# Patient Record
Sex: FEMALE | Race: WHITE
[De-identification: names, ages, dates, MRNs, and addresses within clinical notes are randomized per-mention and may not be internally consistent; named-entity substitution may affect disease eponyms.]

---

## 2017-07-22 ENCOUNTER — HEALTH MAINTENANCE LETTER (OUTPATIENT)
Age: 62
End: 2017-07-22

## 2017-11-07 ENCOUNTER — HOSPITAL ENCOUNTER (OUTPATIENT)
Facility: CLINIC | Age: 62
Discharge: HOME | End: 2017-11-07
Attending: PHYSICAL MEDICINE & REHABILITATION | Admitting: PHYSICAL MEDICINE & REHABILITATION
Payer: COMMERCIAL

## 2017-11-07 ENCOUNTER — ANESTHESIA EVENT (OUTPATIENT)
Dept: SURGERY | Facility: CLINIC | Age: 62
End: 2017-11-07
Payer: COMMERCIAL

## 2017-11-07 ENCOUNTER — ANESTHESIA (OUTPATIENT)
Dept: SURGERY | Facility: CLINIC | Age: 62
End: 2017-11-07
Payer: COMMERCIAL

## 2017-11-07 VITALS
SYSTOLIC BLOOD PRESSURE: 151 MMHG | DIASTOLIC BLOOD PRESSURE: 98 MMHG | OXYGEN SATURATION: 96 % | TEMPERATURE: 98.2 F | BODY MASS INDEX: 37.72 KG/M2 | RESPIRATION RATE: 16 BRPM | HEIGHT: 69 IN | WEIGHT: 254.7 LBS

## 2017-11-07 PROCEDURE — 25000132 ZZH RX MED GY IP 250 OP 250 PS 637: Performed by: ANESTHESIOLOGY

## 2017-11-07 PROCEDURE — C1787 PATIENT PROGR, NEUROSTIM: HCPCS | Performed by: PHYSICAL MEDICINE & REHABILITATION

## 2017-11-07 PROCEDURE — 27211024 ZZHC OR SUPPLY OTHER OPNP: Performed by: PHYSICAL MEDICINE & REHABILITATION

## 2017-11-07 PROCEDURE — C1820 GENERATOR NEURO RECHG BAT SY: HCPCS | Performed by: PHYSICAL MEDICINE & REHABILITATION

## 2017-11-07 PROCEDURE — 36000063 ZZH SURGERY LEVEL 4 EA 15 ADDTL MIN: Performed by: PHYSICAL MEDICINE & REHABILITATION

## 2017-11-07 PROCEDURE — 37000009 ZZH ANESTHESIA TECHNICAL FEE, EACH ADDTL 15 MIN: Performed by: PHYSICAL MEDICINE & REHABILITATION

## 2017-11-07 PROCEDURE — 25000128 H RX IP 250 OP 636: Performed by: NURSE ANESTHETIST, CERTIFIED REGISTERED

## 2017-11-07 PROCEDURE — 25000128 H RX IP 250 OP 636: Performed by: ANESTHESIOLOGY

## 2017-11-07 PROCEDURE — 71000012 ZZH RECOVERY PHASE 1 LEVEL 1 FIRST HR: Performed by: PHYSICAL MEDICINE & REHABILITATION

## 2017-11-07 PROCEDURE — 71000027 ZZH RECOVERY PHASE 2 EACH 15 MINS: Performed by: PHYSICAL MEDICINE & REHABILITATION

## 2017-11-07 PROCEDURE — 36000065 ZZH SURGERY LEVEL 4 W FLUORO 1ST 30 MIN: Performed by: PHYSICAL MEDICINE & REHABILITATION

## 2017-11-07 PROCEDURE — 27210794 ZZH OR GENERAL SUPPLY STERILE: Performed by: PHYSICAL MEDICINE & REHABILITATION

## 2017-11-07 PROCEDURE — 25000125 ZZHC RX 250: Performed by: NURSE ANESTHETIST, CERTIFIED REGISTERED

## 2017-11-07 PROCEDURE — 40000170 ZZH STATISTIC PRE-PROCEDURE ASSESSMENT II: Performed by: PHYSICAL MEDICINE & REHABILITATION

## 2017-11-07 PROCEDURE — 25000128 H RX IP 250 OP 636: Performed by: PHYSICAL MEDICINE & REHABILITATION

## 2017-11-07 PROCEDURE — 37000008 ZZH ANESTHESIA TECHNICAL FEE, 1ST 30 MIN: Performed by: PHYSICAL MEDICINE & REHABILITATION

## 2017-11-07 PROCEDURE — 27210995 ZZH RX 272: Performed by: PHYSICAL MEDICINE & REHABILITATION

## 2017-11-07 DEVICE — IMPLANTABLE DEVICE: Type: IMPLANTABLE DEVICE | Site: BACK | Status: FUNCTIONAL

## 2017-11-07 RX ORDER — LIDOCAINE HYDROCHLORIDE 20 MG/ML
INJECTION, SOLUTION INFILTRATION; PERINEURAL PRN
Status: DISCONTINUED | OUTPATIENT
Start: 2017-11-07 | End: 2017-11-07

## 2017-11-07 RX ORDER — LIDOCAINE 40 MG/G
CREAM TOPICAL
Status: DISCONTINUED | OUTPATIENT
Start: 2017-11-07 | End: 2017-11-07 | Stop reason: HOSPADM

## 2017-11-07 RX ORDER — NALOXONE HYDROCHLORIDE 0.4 MG/ML
.1-.4 INJECTION, SOLUTION INTRAMUSCULAR; INTRAVENOUS; SUBCUTANEOUS
Status: DISCONTINUED | OUTPATIENT
Start: 2017-11-07 | End: 2017-11-07 | Stop reason: HOSPADM

## 2017-11-07 RX ORDER — SODIUM CHLORIDE, SODIUM LACTATE, POTASSIUM CHLORIDE, CALCIUM CHLORIDE 600; 310; 30; 20 MG/100ML; MG/100ML; MG/100ML; MG/100ML
INJECTION, SOLUTION INTRAVENOUS CONTINUOUS PRN
Status: DISCONTINUED | OUTPATIENT
Start: 2017-11-07 | End: 2017-11-07

## 2017-11-07 RX ORDER — FENTANYL CITRATE 50 UG/ML
INJECTION, SOLUTION INTRAMUSCULAR; INTRAVENOUS PRN
Status: DISCONTINUED | OUTPATIENT
Start: 2017-11-07 | End: 2017-11-07

## 2017-11-07 RX ORDER — MULTIPLE VITAMINS W/ MINERALS TAB 9MG-400MCG
1 TAB ORAL DAILY
COMMUNITY

## 2017-11-07 RX ORDER — ONDANSETRON 2 MG/ML
INJECTION INTRAMUSCULAR; INTRAVENOUS PRN
Status: DISCONTINUED | OUTPATIENT
Start: 2017-11-07 | End: 2017-11-07

## 2017-11-07 RX ORDER — CEFAZOLIN SODIUM 2 G/100ML
2 INJECTION, SOLUTION INTRAVENOUS
Status: COMPLETED | OUTPATIENT
Start: 2017-11-07 | End: 2017-11-07

## 2017-11-07 RX ORDER — SODIUM CHLORIDE, SODIUM LACTATE, POTASSIUM CHLORIDE, CALCIUM CHLORIDE 600; 310; 30; 20 MG/100ML; MG/100ML; MG/100ML; MG/100ML
INJECTION, SOLUTION INTRAVENOUS CONTINUOUS
Status: DISCONTINUED | OUTPATIENT
Start: 2017-11-07 | End: 2017-11-07 | Stop reason: HOSPADM

## 2017-11-07 RX ORDER — HYDROMORPHONE HYDROCHLORIDE 1 MG/ML
.3-.5 INJECTION, SOLUTION INTRAMUSCULAR; INTRAVENOUS; SUBCUTANEOUS EVERY 10 MIN PRN
Status: DISCONTINUED | OUTPATIENT
Start: 2017-11-07 | End: 2017-11-07 | Stop reason: HOSPADM

## 2017-11-07 RX ORDER — ONDANSETRON 4 MG/1
4 TABLET, ORALLY DISINTEGRATING ORAL EVERY 30 MIN PRN
Status: DISCONTINUED | OUTPATIENT
Start: 2017-11-07 | End: 2017-11-07 | Stop reason: HOSPADM

## 2017-11-07 RX ORDER — OXYCODONE AND ACETAMINOPHEN 5; 325 MG/1; MG/1
1 TABLET ORAL ONCE
Status: COMPLETED | OUTPATIENT
Start: 2017-11-07 | End: 2017-11-07

## 2017-11-07 RX ORDER — MEPERIDINE HYDROCHLORIDE 25 MG/ML
12.5 INJECTION INTRAMUSCULAR; INTRAVENOUS; SUBCUTANEOUS
Status: DISCONTINUED | OUTPATIENT
Start: 2017-11-07 | End: 2017-11-07 | Stop reason: HOSPADM

## 2017-11-07 RX ORDER — MAGNESIUM HYDROXIDE 1200 MG/15ML
LIQUID ORAL PRN
Status: DISCONTINUED | OUTPATIENT
Start: 2017-11-07 | End: 2017-11-07 | Stop reason: HOSPADM

## 2017-11-07 RX ORDER — ONDANSETRON 2 MG/ML
4 INJECTION INTRAMUSCULAR; INTRAVENOUS EVERY 30 MIN PRN
Status: DISCONTINUED | OUTPATIENT
Start: 2017-11-07 | End: 2017-11-07 | Stop reason: HOSPADM

## 2017-11-07 RX ORDER — PROPOFOL 10 MG/ML
INJECTION, EMULSION INTRAVENOUS CONTINUOUS PRN
Status: DISCONTINUED | OUTPATIENT
Start: 2017-11-07 | End: 2017-11-07

## 2017-11-07 RX ORDER — FENTANYL CITRATE 50 UG/ML
25-50 INJECTION, SOLUTION INTRAMUSCULAR; INTRAVENOUS
Status: DISCONTINUED | OUTPATIENT
Start: 2017-11-07 | End: 2017-11-07 | Stop reason: HOSPADM

## 2017-11-07 RX ADMIN — ONDANSETRON 4 MG: 2 INJECTION INTRAMUSCULAR; INTRAVENOUS at 13:13

## 2017-11-07 RX ADMIN — SODIUM CHLORIDE, POTASSIUM CHLORIDE, SODIUM LACTATE AND CALCIUM CHLORIDE: 600; 310; 30; 20 INJECTION, SOLUTION INTRAVENOUS at 12:42

## 2017-11-07 RX ADMIN — FENTANYL CITRATE 50 MCG: 50 INJECTION INTRAMUSCULAR; INTRAVENOUS at 13:48

## 2017-11-07 RX ADMIN — FENTANYL CITRATE 50 MCG: 50 INJECTION, SOLUTION INTRAMUSCULAR; INTRAVENOUS at 13:09

## 2017-11-07 RX ADMIN — DEXMEDETOMIDINE HYDROCHLORIDE 0.4 MCG/KG/HR: 100 INJECTION, SOLUTION INTRAVENOUS at 12:48

## 2017-11-07 RX ADMIN — LIDOCAINE HYDROCHLORIDE 60 MG: 20 INJECTION, SOLUTION INFILTRATION; PERINEURAL at 12:51

## 2017-11-07 RX ADMIN — FENTANYL CITRATE 50 MCG: 50 INJECTION, SOLUTION INTRAMUSCULAR; INTRAVENOUS at 12:52

## 2017-11-07 RX ADMIN — MIDAZOLAM HYDROCHLORIDE 2 MG: 1 INJECTION, SOLUTION INTRAMUSCULAR; INTRAVENOUS at 12:42

## 2017-11-07 RX ADMIN — FENTANYL CITRATE 50 MCG: 50 INJECTION INTRAMUSCULAR; INTRAVENOUS at 13:34

## 2017-11-07 RX ADMIN — PROPOFOL 100 MCG/KG/MIN: 10 INJECTION, EMULSION INTRAVENOUS at 12:52

## 2017-11-07 RX ADMIN — OXYCODONE HYDROCHLORIDE AND ACETAMINOPHEN 1 TABLET: 5; 325 TABLET ORAL at 13:55

## 2017-11-07 RX ADMIN — CEFAZOLIN SODIUM 2 G: 2 INJECTION, SOLUTION INTRAVENOUS at 12:57

## 2017-11-07 NOTE — ANESTHESIA CARE TRANSFER NOTE
Patient: Erin Lux    Procedure(s):  SPINAL CORD STIMULATOR BATTERY/GENERATOR REPLACEMENT  - Wound Class: I-Clean    Diagnosis: LOW BACK PAIN  Diagnosis Additional Information: No value filed.    Anesthesia Type:   MAC     Note:  Airway :Room Air  Patient transferred to:PACU  Comments: Transferred to PACU, spontaneous RR, on room air.  Monitors and alarms on and functioning, VSS, patient awake and comfortable.  Report to PACU RN.Handoff Report: Identifed the Patient, Identified the Reponsible Provider, Reviewed the pertinent medical history, Discussed the surgical course, Reviewed Intra-OP anesthesia mangement and issues during anesthesia, Set expectations for post-procedure period and Allowed opportunity for questions and acknowledgement of understanding      Vitals: (Last set prior to Anesthesia Care Transfer)    CRNA VITALS  11/7/2017 1253 - 11/7/2017 1327      11/7/2017             Resp Rate (set): 10                Electronically Signed By: MADIE Brunson CRNA  November 7, 2017  1:27 PM

## 2017-11-07 NOTE — ANESTHESIA POSTPROCEDURE EVALUATION
Patient: Erin Lux    Procedure(s):  SPINAL CORD STIMULATOR BATTERY/GENERATOR REPLACEMENT  - Wound Class: I-Clean    Diagnosis:LOW BACK PAIN  Diagnosis Additional Information: No value filed.    Anesthesia Type:  MAC    Note:  Anesthesia Post Evaluation    Patient location during evaluation: PACU  Patient participation: Able to fully participate in evaluation  Level of consciousness: awake  Pain management: adequate  Cardiovascular status: acceptable  Respiratory status: acceptable  Hydration status: acceptable  PONV: none     Anesthetic complications: None          Last vitals:  Vitals:    11/07/17 1348 11/07/17 1400 11/07/17 1446   BP:  125/80 (!) 151/98   Resp: 14 14 16   Temp:      SpO2: 96% 95% 96%         Electronically Signed By: CHRISTINE HERNANDEZ  November 7, 2017  5:00 PM

## 2017-11-07 NOTE — DISCHARGE INSTRUCTIONS
Same Day Surgery Discharge Instructions for  Sedation and General Anesthesia       It's not unusual to feel dizzy, light-headed or faint for up to 24 hours after surgery or while taking pain medication.  If you have these symptoms: sit for a few minutes before standing and have someone assist you when you get up to walk or use the bathroom.      You should rest and relax for the next 24 hours. We recommend you make arrangements to have an adult stay with you for at least 24 hours after your discharge.  Avoid hazardous and strenuous activity.      DO NOT DRIVE any vehicle or operate mechanical equipment for 24 hours following the end of your surgery.  Even though you may feel normal, your reactions may be affected by the medication you have received.      Do not drink alcoholic beverages for 24 hours following surgery.       Slowly progress to your regular diet as you feel able. It's not unusual to feel nauseated and/or vomit after receiving anesthesia.  If you develop these symptoms, drink clear liquids (apple juice, ginger ale, broth, 7-up, etc. ) until you feel better.  If your nausea and vomiting persists for 24 hours, please notify your surgeon.        All narcotic pain medications, along with inactivity and anesthesia, can cause constipation. Drinking plenty of liquids and increasing fiber intake will help.      For any questions of a medical nature, call your surgeon.      Do not make important decisions for 24 hours.      If you had general anesthesia, you may have a sore throat for a couple of days related to the breathing tube used during surgery.  You may use Cepacol lozenges to help with this discomfort.  If it worsens or if you develop a fever, contact your surgeon.       If you feel your pain is not well managed with the pain medications prescribed by your surgeon, please contact your surgeon's office to let them know so they can address your concerns.         Follow up Friday at clinic at 10:30  AM      NEUROSTIMULATOR (SPINAL CORD STIMULATOR) HANDOUT  [facility]    THE GOALS AND EXPECTATIONS OF THE NEUROSTIMULATOR  1. You will need to refrain from having any dental work or colonoscopy for 2 weeks prior and 2 weeks after this procedure.  2. Certain things such as an MRI, ultrasound, diathermy, chiropractic manipulation, radiation or electrocautery may damage your neurostimulator and you should avoid these things or get MD approval first.  Cardiac pacemakers and defibrillators may interfere with the neurostimulator.  3. Normal household equipment such as cell phones, microwaves, TV s, computers, electric blankets and heating pads will not damage the neurostimulator.  4. There are activity restrictions during the recovery period after the trial implant as well as after the permanent implant.    ACTIVITY RESTRICTIONS  The following restrictions will be in place during your trial while the leads are in place and for 4-6 weeks following lead placement of the permanent neurostimulator:  1. No raising hands above the head.  2. No twisting, bending or stretching body at the waist.  3. No lifting items greater than 5 pounds.  4. No sitting for prolonged periods of time 2 hours maximum.  (Some things that may help you comply with these restrictions are: slip on shoes, a step stool for the kitchen or bathroom and a  reacher  to grasp objects *these can be purchased at most pharmacies.)    *Following these restrictions will help ensure your leads do not move from the epidural space where  Will placed them.  Once the permanent device is implanted, scar tissue will build up which will help ensure the leads do not migrate.    SITUATIONS IN WHICH YOU CANNOT USE THE STIMULATOR  1. Driving an automobile, motorcycle, boat, riding  or anything motorized.  2. Operating any type of machinery or equipment such as: chain saw, electric nailer or wood cutting tools.    *Why?  Any quick movement or position  change can cause extra stimulation to the nerves resulting in you losing your ability to control an automobile or tool.  You will eventually learn which positions cause this extra stimulation, but it is best for you to use the magnet to simply turn off the stimulator while doing these tasks in order to avoid accidents.    RISKS  1. Any complication following surgery or anesthesia is possible, including but not limited to bleeding, infection, and death.  2. Infection in the epidural space could potentially lead to meningitis or an epidural abscess.  This would result in removal of the spinal cord stimulator.  3. Accumulation of fluid in the power source site (seroma).  4. Spinal headache which sometimes requires a blood patch.  5. Mechanical complications with the system including dislodgement of the lead/electrode, breaks in the wiring or problems with the power source.  6. Bleeding into the epidural space could result in a hematoma and nerve damage.  This may require surgical removal of the spinal cord stimulator.    DISCHARGE INSTRUCTIONS  1. Temporary surgical pain may require pain management with the use of opioids (narcotic pain medication prescribed by your provider).  Opioids can cause constipation.  Make sure you are eating plenty of fibrous foods and drinking plenty of water.  For a while, you may need to use an over-the-counter laxative such as Senokot or Milk of Magnesia.  2. You may not shower at all while your trial catheter is in.  You may shower 24 hours after your trial leads are pulled.  You may not shower for 5 days after the permanent implant surgery. Remove your dressings before showering on the 5th day.  3. You may bathe 7 days after your trial leads are pulled.  You may bathe 3 weeks after your permanent implant surgery.  4. You should start your oral antibiotics the day after your trial and/or permanent implantation and continue taking them for 10 days.  5. Sometimes a leakage of cerebral  spinal fluid around the lead site may cause a spinal headache.  To alleviate this headache try lying flat as much as possible and drinking plenty of non-carbonated caffeinated fluids.  6. Stimulator reprogramming may be required periodically for the next few months.  neurost  SYMPTOMS TO REPORT  1. Signs of infection such as chills, fever, increased pain, redness, drainage or swelling from the incision site.  2. Headache persisting beyond 48 hours.  3. Unusual changes in sensation or loss of sensation.  4. Painful sensations from the neurostimulator: PLEASE STOP THE STIMULATOR and call your doctor [phone]!  5. It is considered a medical emergency if you experience:  a. Sudden severe back pain  b. Sudden onset of leg weakness or severe spasms  c. Loss of bladder control and / or bowel function

## 2017-11-07 NOTE — IP AVS SNAPSHOT
[facility]    [address]    [place] [address]    Phone:  [phone]    Fax:  [phone]                                       After Visit Summary   11/7/2017    Erin Lux    MRN: [id number]           After Visit Summary Signature Page     I have received my discharge instructions, and my questions have been answered. I have discussed any challenges I see with this plan with the nurse or doctor.    ..........................................................................................................................................  Patient/Patient Representative Signature      ..........................................................................................................................................  Patient Representative Print Name and Relationship to Patient    ..................................................               ................................................  Date                                            Time    ..........................................................................................................................................  Reviewed by Signature/Title    ...................................................              ..............................................  Date                                                            Time

## 2017-11-07 NOTE — ANESTHESIA PREPROCEDURE EVALUATION
Anesthesia Evaluation     . Pt has had prior anesthetic.     History of anesthetic complications          ROS/MED HX    ENT/Pulmonary:     (+)Intermittent Treatment: Inhaler prn,  , . .   (-) sleep apnea   Neurologic:       Cardiovascular:     (+) hypertension----. : . . . :. .       METS/Exercise Tolerance:     Hematologic:         Musculoskeletal:         GI/Hepatic:        (-) GERD   Renal/Genitourinary:         Endo:     (+) Obesity, .      Psychiatric:         Infectious Disease:         Malignancy:         Other:    (+) H/O Chronic Pain,                   Physical Exam  Normal systems: cardiovascular and pulmonary    Airway   Mallampati: II  TM distance: >3 FB  Neck ROM: full    Dental   Comment: native    Cardiovascular       Pulmonary                     Anesthesia Plan      History & Physical Review  History and physical reviewed and following examination; no interval change.    ASA Status:  2 .    NPO Status:  > 8 hours    Plan for MAC   PONV prophylaxis:  Ondansetron (or other 5HT-3)       Postoperative Care      Consents  Anesthetic plan, risks, benefits and alternatives discussed with:  Patient..                          .

## 2017-11-07 NOTE — PROGRESS NOTES
Admission medication history interview status for the 11/7/2017  admission is complete. See EPIC admission navigator for prior to admission medications     Medication history source reliability:Good    Medication history interview source(s):Patient    Medication history resources (including written lists, pill bottles, clinic record):None    Primary pharmacy.[facility]    Additional medication history information not noted on PTA med list :None    Time spent in this activity: 45 minutes    Prior to Admission medications    Medication Sig Last Dose Taking? Auth Provider   CALCIUM PO Take 1 tablet by mouth daily 11/6/2017 at am Yes Reported, Patient   ZOLPIDEM TARTRATE PO Take 10 mg by mouth At Bedtime 11/6/2017 at pm Yes Reported, Patient   ATORVASTATIN CALCIUM PO Take 20 mg by mouth daily 11/6/2017 at 2100 Yes Reported, Patient   Loratadine (CLARITIN PO) Take 10 mg by mouth daily as needed 10/31/2017 at prn Yes Reported, Patient   multivitamin, therapeutic with minerals (THERA-VIT-M) TABS tablet Take 1 tablet by mouth daily 11/6/2017 at am Yes Reported, Patient   Multiple Vitamins-Minerals (OCUVITE PO) Take 1 tablet by mouth daily 11/6/2017 at am Yes Reported, Patient   Ibuprofen (ADVIL PO) Take 400 mg by mouth 4 times daily as needed for moderate pain 10/31/2017 at prn Yes Reported, Patient   Polyethyl Glycol-Propyl Glycol (SYSTANE OP) Place 1-2 tablets into both eyes daily as needed 11/3/2017 at prn Yes Reported, Patient   Acetaminophen (TYLENOL PO) Take 500-1,000 mg by mouth daily as needed for mild pain or fever 11/7/2017 at 0000 Yes Reported, Patient   oxyCODONE-acetaminophen (PERCOCET) 5-325 MG per tablet Take 1 tablet by mouth 2 times daily as needed for moderate to severe pain 11/6/2017 at 2100 Yes Reported, Patient   albuterol (VENTOLIN HFA) 108 (90 BASE) MCG/ACT inhaler Inhale 2 puffs into the lungs CFC FREE.  EVERY 4-6 HOURS PRN more than a month at prn Yes Reported, Patient   fluticasone (FLONASE) 50 MCG/ACT  nasal spray Spray 2 sprays into both nostrils daily as needed  10/31/2017 Yes Reported, Patient   olopatadine (PATANOL) 0.1 % ophthalmic solution Place 1 drop into both eyes 2 times daily as needed for allergies 11/3/2017 at prn Yes Reported, Patient   triamterene-hydrochlorothiazide (MAXZIDE-25) 37.5-25 MG per tablet Take 1 tablet by mouth daily. 11/6/2017 at am Yes Sophie Barrientos NP   Cholecalciferol (VITAMIN D) 2000 UNITS tablet Take 2,000 Units by mouth daily. 11/6/2017 at am Yes Sophie Barrientos, LILLIAN

## 2017-11-07 NOTE — IP AVS SNAPSHOT
MRN:[id number]                      After Visit Summary   11/7/2017    Erin Lux    MRN: [id number]           Thank you!     Thank you for choosing [facility] for your care. Our goal is always to provide you with excellent care. Hearing back from our patients is one way we can continue to improve our services. Please take a few minutes to complete the written survey that you may receive in the mail after you visit with us. Thank you!        Patient Information     Date Of Birth          1955        About your hospital stay     You were admitted on:  November 7, 2017 You last received care in the:  [facility]    You were discharged on:  November 7, 2017       Who to Call     For medical emergencies, please call 911.  For non-urgent questions about your medical care, please call your primary care provider or clinic, [phone]  For questions related to your surgery, please call your surgery clinic        Attending Provider     Provider Terri Magaña, DO Physical Medicine and Rehab       Primary Care Provider Office Phone # Fax #    Malinda RENDON Macias [phone]      Further instructions from your care team       Same Day Surgery Discharge Instructions for  Sedation and General Anesthesia       It's not unusual to feel dizzy, light-headed or faint for up to 24 hours after surgery or while taking pain medication.  If you have these symptoms: sit for a few minutes before standing and have someone assist you when you get up to walk or use the bathroom.      You should rest and relax for the next 24 hours. We recommend you make arrangements to have an adult stay with you for at least 24 hours after your discharge.  Avoid hazardous and strenuous activity.      DO NOT DRIVE any vehicle or operate mechanical equipment for 24 hours following the end of your surgery.  Even though you may feel normal, your reactions may be affected by the  medication you have received.      Do not drink alcoholic beverages for 24 hours following surgery.       Slowly progress to your regular diet as you feel able. It's not unusual to feel nauseated and/or vomit after receiving anesthesia.  If you develop these symptoms, drink clear liquids (apple juice, ginger ale, broth, 7-up, etc. ) until you feel better.  If your nausea and vomiting persists for 24 hours, please notify your surgeon.        All narcotic pain medications, along with inactivity and anesthesia, can cause constipation. Drinking plenty of liquids and increasing fiber intake will help.      For any questions of a medical nature, call your surgeon.      Do not make important decisions for 24 hours.      If you had general anesthesia, you may have a sore throat for a couple of days related to the breathing tube used during surgery.  You may use Cepacol lozenges to help with this discomfort.  If it worsens or if you develop a fever, contact your surgeon.       If you feel your pain is not well managed with the pain medications prescribed by your surgeon, please contact your surgeon's office to let them know so they can address your concerns.         Follow up Friday at clinic at 10:30 AM      NEUROSTIMULATOR (SPINAL CORD STIMULATOR) HANDOUT  [facility]    THE GOALS AND EXPECTATIONS OF THE NEUROSTIMULATOR  1. You will need to refrain from having any dental work or colonoscopy for 2 weeks prior and 2 weeks after this procedure.  2. Certain things such as an MRI, ultrasound, diathermy, chiropractic manipulation, radiation or electrocautery may damage your neurostimulator and you should avoid these things or get MD approval first.  Cardiac pacemakers and defibrillators may interfere with the neurostimulator.  3. Normal household equipment such as cell phones, microwaves, TV s, computers, electric blankets and heating pads will not damage the neurostimulator.  4. There are activity restrictions  during the recovery period after the trial implant as well as after the permanent implant.    ACTIVITY RESTRICTIONS  The following restrictions will be in place during your trial while the leads are in place and for 4-6 weeks following lead placement of the permanent neurostimulator:  1. No raising hands above the head.  2. No twisting, bending or stretching body at the waist.  3. No lifting items greater than 5 pounds.  4. No sitting for prolonged periods of time 2 hours maximum.  (Some things that may help you comply with these restrictions are: slip on shoes, a step stool for the kitchen or bathroom and a  reacher  to grasp objects *these can be purchased at most pharmacies.)    *Following these restrictions will help ensure your leads do not move from the epidural space where  Will placed them.  Once the permanent device is implanted, scar tissue will build up which will help ensure the leads do not migrate.    SITUATIONS IN WHICH YOU CANNOT USE THE STIMULATOR  1. Driving an automobile, motorcycle, boat, riding  or anything motorized.  2. Operating any type of machinery or equipment such as: chain saw, electric nailer or wood cutting tools.    *Why?  Any quick movement or position change can cause extra stimulation to the nerves resulting in you losing your ability to control an automobile or tool.  You will eventually learn which positions cause this extra stimulation, but it is best for you to use the magnet to simply turn off the stimulator while doing these tasks in order to avoid accidents.    RISKS  1. Any complication following surgery or anesthesia is possible, including but not limited to bleeding, infection, and death.  2. Infection in the epidural space could potentially lead to meningitis or an epidural abscess.  This would result in removal of the spinal cord stimulator.  3. Accumulation of fluid in the power source site (seroma).  4. Spinal headache which sometimes requires a blood  patch.  5. Mechanical complications with the system including dislodgement of the lead/electrode, breaks in the wiring or problems with the power source.  6. Bleeding into the epidural space could result in a hematoma and nerve damage.  This may require surgical removal of the spinal cord stimulator.    DISCHARGE INSTRUCTIONS  1. Temporary surgical pain may require pain management with the use of opioids (narcotic pain medication prescribed by your provider).  Opioids can cause constipation.  Make sure you are eating plenty of fibrous foods and drinking plenty of water.  For a while, you may need to use an over-the-counter laxative such as Senokot or Milk of Magnesia.  2. You may not shower at all while your trial catheter is in.  You may shower 24 hours after your trial leads are pulled.  You may not shower for 5 days after the permanent implant surgery. Remove your dressings before showering on the 5th day.  3. You may bathe 7 days after your trial leads are pulled.  You may bathe 3 weeks after your permanent implant surgery.  4. You should start your oral antibiotics the day after your trial and/or permanent implantation and continue taking them for 10 days.  5. Sometimes a leakage of cerebral spinal fluid around the lead site may cause a spinal headache.  To alleviate this headache try lying flat as much as possible and drinking plenty of non-carbonated caffeinated fluids.  6. Stimulator reprogramming may be required periodically for the next few months.  neurost  SYMPTOMS TO REPORT  1. Signs of infection such as chills, fever, increased pain, redness, drainage or swelling from the incision site.  2. Headache persisting beyond 48 hours.  3. Unusual changes in sensation or loss of sensation.  4. Painful sensations from the neurostimulator: PLEASE STOP THE STIMULATOR and call your doctor [phone]!  5. It is considered a medical emergency if you experience:  a. Sudden severe back pain  b. Sudden onset of leg  "weakness or severe spasms  c. Loss of bladder control and / or bowel function      Pending Results     Date and Time Order Name Status Description    9/25/2017 0000 EKG CARDIAC - HIM SCAN Preliminary             Admission Information     Date & Time Provider Department Dept. Phone    11/7/2017 Terri Bloom DO [facility] Same Day Surgery [phone]      Your Vitals Were     Blood Pressure Temperature Respirations Height Weight Pulse Oximetry    133/73 98.2  F (36.8  C) (Temporal) 14 1.753 m (5' 9\") 115.5 kg (254 lb 11.2 oz) 96%    BMI (Body Mass Index)                   37.61 kg/m2           MyChart Information     [facility] [employer] gives you secure access to your electronic health record. If you see a primary care provider, you can also send messages to your care team and make appointments. If you have questions, please call your primary care clinic.  If you do not have a primary care provider, please call [phone] and they will assist you.        Care EveryWhere ID     This is your Care EveryWhere ID. This could be used by other organizations to access your [facility] medical records  [id number]        Equal Access to Services     COURTNEY RUTLEDGE AH: Hadluisa Finley, zacarias hinkle, qapérez stewart, ashley greenberg. So [facility] [phone].    ATENCIÓN: Si habla español, tiene a cohn disposición servicios gratuitos de asistencia lingüística. Llame al [phone].    We comply with applicable federal civil rights laws and [place] laws. We do not discriminate on the basis of race, color, national origin, age, disability, sex, sexual orientation, or gender identity.               Review of your medicines      UNREVIEWED medicines. Ask your doctor about these medicines        Dose / Directions    ADVIL PO        Dose:  400 mg   Take 400 mg by mouth 4 times daily as needed for moderate pain   Refills:  0       ATORVASTATIN CALCIUM PO        Dose:  20 mg   Take 20 mg " by mouth daily   Refills:  0       CALCIUM PO        Dose:  1 tablet   Take 1 tablet by mouth daily   Refills:  0       CLARITIN PO        Dose:  10 mg   Take 10 mg by mouth daily as needed   Refills:  0       fluticasone 50 MCG/ACT spray   Commonly known as:  FLONASE        Dose:  2 spray   Spray 2 sprays into both nostrils daily as needed   Refills:  0       * multivitamin, therapeutic with minerals Tabs tablet        Dose:  1 tablet   Take 1 tablet by mouth daily   Refills:  0       * OCUVITE PO        Dose:  1 tablet   Take 1 tablet by mouth daily   Refills:  0       olopatadine 0.1 % ophthalmic solution   Commonly known as:  PATANOL        Dose:  1 drop   Place 1 drop into both eyes 2 times daily as needed for allergies   Refills:  0       oxyCODONE-acetaminophen 5-325 MG per tablet   Commonly known as:  PERCOCET        Dose:  1 tablet   Take 1 tablet by mouth 2 times daily as needed for moderate to severe pain   Refills:  0       SYSTANE OP        Dose:  1-2 tablet   Place 1-2 tablets into both eyes daily as needed   Refills:  0       triamterene-hydrochlorothiazide 37.5-25 MG per tablet   Commonly known as:  MAXZIDE-25   Used for:  Hypertension goal BP (blood pressure) < 140/90        Dose:  1 tablet   Take 1 tablet by mouth daily.   Quantity:  90 tablet   Refills:  3       TYLENOL PO        Dose:  500-1000 mg   Take 500-1,000 mg by mouth daily as needed for mild pain or fever   Refills:  0       VENTOLIN  (90 BASE) MCG/ACT Inhaler   Generic drug:  albuterol        Dose:  2 puff   Inhale 2 puffs into the lungs CFC FREE.  EVERY 4-6 HOURS PRN   Refills:  0       vitamin D 2000 UNITS tablet        Dose:  2000 Units   Take 2,000 Units by mouth daily.   Quantity:  100 tablet   Refills:  3       ZOLPIDEM TARTRATE PO        Dose:  10 mg   Take 10 mg by mouth At Bedtime   Refills:  0       * Notice:  This list has 2 medication(s) that are the same as other medications prescribed for you. Read the directions  carefully, and ask your doctor or other care provider to review them with you.             Protect others around you: Learn how to safely use, store and throw away your medicines at www.disposemymeds.org.             Medication List: This is a list of all your medications and when to take them. Check marks below indicate your daily home schedule. Keep this list as a reference.      Medications           Morning Afternoon Evening Bedtime As Needed    ADVIL PO   Take 400 mg by mouth 4 times daily as needed for moderate pain                                ATORVASTATIN CALCIUM PO   Take 20 mg by mouth daily                                CALCIUM PO   Take 1 tablet by mouth daily                                CLARITIN PO   Take 10 mg by mouth daily as needed                                fluticasone 50 MCG/ACT spray   Commonly known as:  FLONASE   Spray 2 sprays into both nostrils daily as needed                                * multivitamin, therapeutic with minerals Tabs tablet   Take 1 tablet by mouth daily                                * OCUVITE PO   Take 1 tablet by mouth daily                                olopatadine 0.1 % ophthalmic solution   Commonly known as:  PATANOL   Place 1 drop into both eyes 2 times daily as needed for allergies                                oxyCODONE-acetaminophen 5-325 MG per tablet   Commonly known as:  PERCOCET   Take 1 tablet by mouth 2 times daily as needed for moderate to severe pain   Last time this was given:  1 tablet on 11/7/2017  1:55 PM                                SYSTANE OP   Place 1-2 tablets into both eyes daily as needed                                triamterene-hydrochlorothiazide 37.5-25 MG per tablet   Commonly known as:  MAXZIDE-25   Take 1 tablet by mouth daily.                                TYLENOL PO   Take 500-1,000 mg by mouth daily as needed for mild pain or fever                                VENTOLIN  (90 BASE) MCG/ACT Inhaler   Inhale 2  puffs into the lungs CFC FREE.  EVERY 4-6 HOURS PRN   Generic drug:  albuterol                                vitamin D 2000 UNITS tablet   Take 2,000 Units by mouth daily.                                ZOLPIDEM TARTRATE PO   Take 10 mg by mouth At Bedtime                                * Notice:  This list has 2 medication(s) that are the same as other medications prescribed for you. Read the directions carefully, and ask your doctor or other care provider to review them with you.

## 2017-11-14 NOTE — PROCEDURES
Name:   Erin Lux   :   1955  Procedure date: 2017  Surgeon:   Terri Bloom DO  Procedure:    Medtronic Spinal Cord Stimulator Generator Replacement  Indication:  Low back pain  M54.5    Allergies:  Ingredient Reaction Comment   ADHESIVE BANDAGE     ADHESIVE TAPE     BACITRACIN     POLYMYXIN B     NEOMYCIN SULFATE     POLYMYXIN B     BACITRACIN ZINC       Anesthesia: Propofol, MAC and local.  Implants: All implants were Medtronic products.  The Intellis implantable pulse generator was model #03329 and serial number [id number].    Description of Procedure:  After discussing potential risks and benefits, the patient did wish to proceed and signed a written consent form. The pocket site was marked in the preoperative area. A prophylactic prescription was given for the patient to use for the next 10 days. The patient was brought into the operating room and positioned prone on the operating table taking care relieve all pressure points and place extremities in a comfortable position. I then initiated a surgical timeout stating the patient's name, date of birth, allergies, and the procedure to be performed. MAC anesthesia was induced. The operative field was prepped and draped in normal sterile fashion. The skin was anesthetized with Xylocaine at the appropriate level as identified fluoroscopically. The right buttock pocket with the battery was opened. The connection to the leads were disconnected. A new battery was placed inside the existing pocket. The leads were left in place and connected to the Intellis IPG and all connections were secured to torque. An impedance check was performed and all impedances were within normal limits. The IPG and excess cable were then placed into the gluteal pocket with the excess cable coiled behind the IPG. Macroelectrode testing was performed and the patient was getting adequate stimulation in the targeted areas.     The wound was then irrigated profusely  with saline solution. The incisions were closed in layers using absorbable sutures. Steri strips and sterile dressings were applied after. Final x-ray revealed no significant changes in lead position.     The patient was awakened, positioned supine and delivered to the recovery  room.    Terri Bloom DO

## 2019-11-04 ENCOUNTER — HEALTH MAINTENANCE LETTER (OUTPATIENT)
Age: 64
End: 2019-11-04

## 2020-11-16 ENCOUNTER — HEALTH MAINTENANCE LETTER (OUTPATIENT)
Age: 65
End: 2020-11-16

## 2021-09-18 ENCOUNTER — HEALTH MAINTENANCE LETTER (OUTPATIENT)
Age: 66
End: 2021-09-18

## 2021-11-13 ENCOUNTER — HEALTH MAINTENANCE LETTER (OUTPATIENT)
Age: 66
End: 2021-11-13

## 2022-01-08 ENCOUNTER — HEALTH MAINTENANCE LETTER (OUTPATIENT)
Age: 67
End: 2022-01-08

## 2022-10-03 ENCOUNTER — LAB REQUISITION (OUTPATIENT)
Dept: LAB | Facility: CLINIC | Age: 67
End: 2022-10-03
Payer: MEDICARE

## 2022-10-03 DIAGNOSIS — Z11.1 ENCOUNTER FOR SCREENING FOR RESPIRATORY TUBERCULOSIS: ICD-10-CM

## 2022-10-04 PROCEDURE — 86481 TB AG RESPONSE T-CELL SUSP: CPT | Mod: ORL | Performed by: INTERNAL MEDICINE

## 2022-10-04 PROCEDURE — 36415 COLL VENOUS BLD VENIPUNCTURE: CPT | Mod: ORL | Performed by: INTERNAL MEDICINE

## 2022-10-04 PROCEDURE — P9603 ONE-WAY ALLOW PRORATED MILES: HCPCS | Mod: ORL | Performed by: INTERNAL MEDICINE

## 2022-10-05 ENCOUNTER — LAB REQUISITION (OUTPATIENT)
Dept: LAB | Facility: CLINIC | Age: 67
End: 2022-10-05
Payer: MEDICARE

## 2022-10-05 DIAGNOSIS — D50.9 IRON DEFICIENCY ANEMIA, UNSPECIFIED: ICD-10-CM

## 2022-10-05 LAB
GAMMA INTERFERON BACKGROUND BLD IA-ACNC: 0.04 IU/ML
M TB IFN-G BLD-IMP: ABNORMAL
M TB IFN-G CD4+ BCKGRND COR BLD-ACNC: 0.15 IU/ML
MITOGEN IGNF BCKGRD COR BLD-ACNC: 0.01 IU/ML
MITOGEN IGNF BCKGRD COR BLD-ACNC: 0.02 IU/ML
QUANTIFERON MITOGEN: 0.19 IU/ML
QUANTIFERON NIL TUBE: 0.04 IU/ML
QUANTIFERON TB1 TUBE: 0.06 IU/ML
QUANTIFERON TB2 TUBE: 0.05

## 2022-10-06 LAB — HGB BLD-MCNC: 8.6 G/DL (ref 11.7–15.7)

## 2022-10-06 PROCEDURE — 36415 COLL VENOUS BLD VENIPUNCTURE: CPT | Mod: ORL | Performed by: INTERNAL MEDICINE

## 2022-10-06 PROCEDURE — 85018 HEMOGLOBIN: CPT | Performed by: INTERNAL MEDICINE

## 2022-10-06 PROCEDURE — P9603 ONE-WAY ALLOW PRORATED MILES: HCPCS | Performed by: INTERNAL MEDICINE

## 2022-11-20 ENCOUNTER — HEALTH MAINTENANCE LETTER (OUTPATIENT)
Age: 67
End: 2022-11-20

## 2023-04-15 ENCOUNTER — HEALTH MAINTENANCE LETTER (OUTPATIENT)
Age: 68
End: 2023-04-15

## 2023-11-19 ENCOUNTER — HEALTH MAINTENANCE LETTER (OUTPATIENT)
Age: 68
End: 2023-11-19

## (undated) DEVICE — DRSG TEGADERM 8X12" 1629

## (undated) DEVICE — NDL 22GA 1.5"

## (undated) DEVICE — DRAPE STERI TOWEL LG 1010

## (undated) DEVICE — PACK MINOR SBA15MIFSE

## (undated) DEVICE — DRAPE IOBAN INCISE 23X17" 6650EZ

## (undated) DEVICE — GLOVE PROTEXIS BLUE W/NEU-THERA 6.0  2D73EB60

## (undated) DEVICE — RECHARGER

## (undated) DEVICE — CONTROLLER

## (undated) DEVICE — PACK UNIVERSAL SPLIT 29131

## (undated) DEVICE — LINEN TOWEL PACK X5 5464

## (undated) DEVICE — PREP DURAPREP 26ML APL 8630

## (undated) DEVICE — DRSG STERI STRIP 1/2X4" R1547

## (undated) DEVICE — DRSG KERLIX FLUFFS X5

## (undated) DEVICE — DRSG GAUZE 4X4" 3033

## (undated) DEVICE — GLOVE PROTEXIS MICRO 6.0  2D73PM60

## (undated) DEVICE — DRAPE SHEET REV FOLD 3/4 9349

## (undated) DEVICE — DRSG TELFA ISLAND 4X10"

## (undated) RX ORDER — CEFAZOLIN SODIUM 2 G/100ML
INJECTION, SOLUTION INTRAVENOUS
Status: DISPENSED
Start: 2017-11-07

## (undated) RX ORDER — EPINEPHRINE 1 MG/ML
INJECTION, SOLUTION INTRAMUSCULAR; SUBCUTANEOUS
Status: DISPENSED
Start: 2017-11-07

## (undated) RX ORDER — PROPOFOL 10 MG/ML
INJECTION, EMULSION INTRAVENOUS
Status: DISPENSED
Start: 2017-11-07

## (undated) RX ORDER — FENTANYL CITRATE 50 UG/ML
INJECTION, SOLUTION INTRAMUSCULAR; INTRAVENOUS
Status: DISPENSED
Start: 2017-11-07

## (undated) RX ORDER — OXYCODONE AND ACETAMINOPHEN 5; 325 MG/1; MG/1
TABLET ORAL
Status: DISPENSED
Start: 2017-11-07

## (undated) RX ORDER — ONDANSETRON 2 MG/ML
INJECTION INTRAMUSCULAR; INTRAVENOUS
Status: DISPENSED
Start: 2017-11-07

## (undated) RX ORDER — LIDOCAINE HYDROCHLORIDE 20 MG/ML
INJECTION, SOLUTION EPIDURAL; INFILTRATION; INTRACAUDAL; PERINEURAL
Status: DISPENSED
Start: 2017-11-07

## (undated) RX ORDER — LIDOCAINE HYDROCHLORIDE AND EPINEPHRINE 10; 10 MG/ML; UG/ML
INJECTION, SOLUTION INFILTRATION; PERINEURAL
Status: DISPENSED
Start: 2017-11-07